# Patient Record
(demographics unavailable — no encounter records)

---

## 2025-05-22 NOTE — HISTORY OF PRESENT ILLNESS
[Stable] : stable [de-identified] : 37 year old female presents for initial evaluation of lower back pain since having an epidural while giving birth since 4/14/25. She states that she was stuck several times while getting the epidural.  She now also feels the pain radiating up to the neck. Denies radiation of pain down the legs. Has intermittent numbness/tingling of the legs. Denies weakness. Standing and walking aggravates the pain. Takes tylenol for the pain. She also uses heat for the pain. PMHx: HTN No fever, chills, sweats, nausea/vomiting. No bowel or bladder dysfunction, no recent weight loss or gain. No night pain. This history is in addition to the intake form that I personally reviewed.

## 2025-05-22 NOTE — ADDENDUM
[FreeTextEntry1] : This note was written by Romeo Jason on 05/22/2025 acting as scribe for Dr. Luis Lee M.D.  I, Luis Lee MD, have read and attest that all the information, medical decision making and discharge instructions within are true and accurate.

## 2025-05-22 NOTE — PHYSICAL EXAM
[Normal] : Gait: normal [Quevedo's Sign] : negative Quevedo's sign [Pronator Drift] : negative pronator drift [SLR] : negative straight leg raise [de-identified] : 5 out of 5 motor strength, sensation is intact and symmetrical full range of motion flexion extension and rotation, no palpatory tenderness full range of motion of hips knees shoulders and elbows (all four extremities), no atrophy, negative straight leg raise, no pathological reflexes, no swelling, normal ambulation, no apparent distress skin is intact, no palpable lymph nodes, no upper or lower extremity instability, alert and oriented x3 and normal mood. Normal finger-to nose test.  No upper motor neuron findings. Mildly restricted right cervical rotation. [de-identified] : XR AP Lat Cervical 05/22/2025 -loss of lordosis- reviewed with patient.    XR AP Lat Lumbar 05/22/2025 -adequate- reviewed with patient.

## 2025-05-22 NOTE — DISCUSSION/SUMMARY
[de-identified] : Loss of cervical lordosis. Lumbar strain and sprain. Discussed all options. Diclofenac PRN. Referral for physical therapy. If no better in 2-3 weeks, will obtain MRI. All options discussed including rest, medicine, home exercise, acupuncture, Chiropractic care, Physical Therapy, Pain management, and last resort surgery. All questions were answered, all alternatives discussed, and the patient is in complete agreement with the treatment plan which the patient contributed to and discussed with me through the shared decision-making process. Follow-up appointment as instructed. Any issues and the patient will call or come in sooner.  agrees with plan.